# Patient Record
Sex: MALE | Race: WHITE | NOT HISPANIC OR LATINO | URBAN - METROPOLITAN AREA
[De-identification: names, ages, dates, MRNs, and addresses within clinical notes are randomized per-mention and may not be internally consistent; named-entity substitution may affect disease eponyms.]

---

## 2019-07-26 ENCOUNTER — OUTPATIENT (OUTPATIENT)
Dept: OUTPATIENT SERVICES | Facility: HOSPITAL | Age: 64
LOS: 1 days | Discharge: HOME | End: 2019-07-26

## 2019-07-26 DIAGNOSIS — D51.0 VITAMIN B12 DEFICIENCY ANEMIA DUE TO INTRINSIC FACTOR DEFICIENCY: ICD-10-CM

## 2019-07-26 DIAGNOSIS — E11.9 TYPE 2 DIABETES MELLITUS WITHOUT COMPLICATIONS: ICD-10-CM

## 2019-07-26 DIAGNOSIS — E78.5 HYPERLIPIDEMIA, UNSPECIFIED: ICD-10-CM

## 2019-07-26 DIAGNOSIS — I10 ESSENTIAL (PRIMARY) HYPERTENSION: ICD-10-CM

## 2019-07-26 DIAGNOSIS — Z00.00 ENCOUNTER FOR GENERAL ADULT MEDICAL EXAMINATION WITHOUT ABNORMAL FINDINGS: ICD-10-CM

## 2019-12-12 ENCOUNTER — OUTPATIENT (OUTPATIENT)
Dept: OUTPATIENT SERVICES | Facility: HOSPITAL | Age: 64
LOS: 1 days | Discharge: HOME | End: 2019-12-12

## 2019-12-12 DIAGNOSIS — I10 ESSENTIAL (PRIMARY) HYPERTENSION: ICD-10-CM

## 2019-12-12 DIAGNOSIS — E11.9 TYPE 2 DIABETES MELLITUS WITHOUT COMPLICATIONS: ICD-10-CM

## 2019-12-12 DIAGNOSIS — E78.00 PURE HYPERCHOLESTEROLEMIA, UNSPECIFIED: ICD-10-CM

## 2019-12-12 DIAGNOSIS — Z00.00 ENCOUNTER FOR GENERAL ADULT MEDICAL EXAMINATION WITHOUT ABNORMAL FINDINGS: ICD-10-CM

## 2022-04-21 VITALS
DIASTOLIC BLOOD PRESSURE: 70 MMHG | WEIGHT: 265 LBS | HEART RATE: 60 BPM | SYSTOLIC BLOOD PRESSURE: 120 MMHG | HEIGHT: 72 IN | BODY MASS INDEX: 35.89 KG/M2 | TEMPERATURE: 97.3 F

## 2022-04-21 VITALS
WEIGHT: 264.55 LBS | TEMPERATURE: 97.3 F | HEART RATE: 60 BPM | SYSTOLIC BLOOD PRESSURE: 120 MMHG | BODY MASS INDEX: 37.04 KG/M2 | HEIGHT: 71 IN | DIASTOLIC BLOOD PRESSURE: 70 MMHG

## 2022-08-18 ENCOUNTER — NON-APPOINTMENT (OUTPATIENT)
Age: 67
End: 2022-08-18

## 2022-08-18 DIAGNOSIS — Z86.69 PERSONAL HISTORY OF OTHER DISEASES OF THE NERVOUS SYSTEM AND SENSE ORGANS: ICD-10-CM

## 2022-08-18 DIAGNOSIS — Z86.79 PERSONAL HISTORY OF OTHER DISEASES OF THE CIRCULATORY SYSTEM: ICD-10-CM

## 2022-08-18 DIAGNOSIS — Z87.39 PERSONAL HISTORY OF OTHER DISEASES OF THE MUSCULOSKELETAL SYSTEM AND CONNECTIVE TISSUE: ICD-10-CM

## 2022-08-18 DIAGNOSIS — E88.81 METABOLIC SYNDROME: ICD-10-CM

## 2022-08-18 DIAGNOSIS — F17.200 NICOTINE DEPENDENCE, UNSPECIFIED, UNCOMPLICATED: ICD-10-CM

## 2022-08-18 RX ORDER — PANTOPRAZOLE 40 MG/1
40 TABLET, DELAYED RELEASE ORAL
Refills: 0 | Status: ACTIVE | COMMUNITY

## 2022-08-18 RX ORDER — DESLORATADINE 5 MG/1
5 TABLET ORAL
Refills: 0 | Status: ACTIVE | COMMUNITY

## 2022-09-07 ENCOUNTER — APPOINTMENT (OUTPATIENT)
Dept: CARDIOLOGY | Facility: CLINIC | Age: 67
End: 2022-09-07

## 2022-09-07 VITALS — BODY MASS INDEX: 36.54 KG/M2 | HEIGHT: 71 IN | WEIGHT: 261 LBS

## 2022-09-07 PROCEDURE — 99215 OFFICE O/P EST HI 40 MIN: CPT

## 2022-09-07 PROCEDURE — 99205 OFFICE O/P NEW HI 60 MIN: CPT

## 2022-11-21 RX ORDER — FAMOTIDINE 10 MG/1
10 TABLET, FILM COATED ORAL DAILY
Qty: 90 | Refills: 3 | Status: ACTIVE | COMMUNITY
Start: 1900-01-01 | End: 1900-01-01

## 2023-01-13 ENCOUNTER — NON-APPOINTMENT (OUTPATIENT)
Age: 68
End: 2023-01-13

## 2023-01-13 DIAGNOSIS — Z86.39 PERSONAL HISTORY OF OTHER ENDOCRINE, NUTRITIONAL AND METABOLIC DISEASE: ICD-10-CM

## 2023-01-13 DIAGNOSIS — I49.9 CARDIAC ARRHYTHMIA, UNSPECIFIED: ICD-10-CM

## 2023-01-13 DIAGNOSIS — Z86.79 PERSONAL HISTORY OF OTHER DISEASES OF THE CIRCULATORY SYSTEM: ICD-10-CM

## 2023-01-13 RX ORDER — OMEGA-3-ACID ETHYL ESTERS 1 G/1
1 CAPSULE, LIQUID FILLED ORAL 4 TIMES DAILY
Refills: 0 | Status: ACTIVE | COMMUNITY

## 2023-01-13 RX ORDER — METOPROLOL SUCCINATE 25 MG/1
25 TABLET, EXTENDED RELEASE ORAL DAILY
Refills: 0 | Status: ACTIVE | COMMUNITY

## 2023-01-30 ENCOUNTER — APPOINTMENT (OUTPATIENT)
Dept: CARDIOLOGY | Facility: CLINIC | Age: 68
End: 2023-01-30
Payer: MEDICARE

## 2023-01-30 VITALS — HEIGHT: 71 IN | BODY MASS INDEX: 35.88 KG/M2 | WEIGHT: 256.31 LBS

## 2023-01-30 VITALS — SYSTOLIC BLOOD PRESSURE: 132 MMHG | HEART RATE: 70 BPM | DIASTOLIC BLOOD PRESSURE: 80 MMHG

## 2023-01-30 PROCEDURE — 99214 OFFICE O/P EST MOD 30 MIN: CPT

## 2023-01-30 RX ORDER — FENOFIBRIC ACID 135 MG/1
135 CAPSULE, DELAYED RELEASE ORAL AT BEDTIME
Refills: 0 | Status: DISCONTINUED | COMMUNITY
End: 2023-01-30

## 2023-01-30 NOTE — HISTORY OF PRESENT ILLNESS
[FreeTextEntry1] : pt with HTN, BIGEMINY, HYPERTG, METABOLIC SYNDROME, PAF, MIKALA ON MIKALA, PT DOES NOT FEEL AFIB. \par \par Pt sister adama burr with pancreatic CA so under stress test, pb bp is controlled. pt having a lot of hip and back pain \par checked rpm and bp at 110/70 area sometimes ramesh to 47. \par pt with no cp or sob, pt not exercising much due to hurting his knee. \par LDL 46 \par HDL 35 \par  \par \par 1/30/23: TG increased to 201, A1c now 6.1 NTbnp: 415 \par h/h: 18/52.8 elevated from 16.4/49.9 at last visit \par

## 2023-01-30 NOTE — DISCUSSION/SUMMARY
[FreeTextEntry1] : pt complains muscle pain but feels due to knee not on a statin\par pt complains of spasm in his calves... pt told short achilles. \par TG at 171 good level for him \par get current meds \par pt refusing vasular w/u thinks due to muscle issues. \par \par get 2 d echo.

## 2023-03-17 ENCOUNTER — RX RENEWAL (OUTPATIENT)
Age: 68
End: 2023-03-17

## 2023-04-28 ENCOUNTER — APPOINTMENT (OUTPATIENT)
Dept: CARDIOLOGY | Facility: CLINIC | Age: 68
End: 2023-04-28
Payer: MEDICARE

## 2023-04-28 PROCEDURE — 93306 TTE W/DOPPLER COMPLETE: CPT

## 2023-06-14 NOTE — HISTORY OF PRESENT ILLNESS
[FreeTextEntry1] : pt with HTN, BIGEMINY, HYPERTG, METABOLIC SYNDROME, PAF, MIKALA ON MIKAAL, PT DOES NOT FEEL AFIB. \par \par Pt sister adama burr with pancreatic CA so under stress test, pb bp is controlled. pt having a lot of hip and back pain \par checked rpm and bp at 110/70 area sometimes ramesh to 47. \par pt with no cp or sob, pt not exercising much due to hurting his knee. \par LDL 46 \par HDL 35 \par  \par \par 23: TG increased to 201, A1c now 6.1 NTbnp: 415 \par h/h: 18/52.8 elevated from 16.4/49.9 at last visit \par pt does not feel bigeminy at this time, stressed from his sister with cancer, pt brother , pt TG increased but eating poorly and A1c: 6.1, pt stopped jardiance for a few days. \par pt still elevated TG, pt no bleeding problems. \par pt had spasms in back and not exercising the last 6 months. \par \par 23:\par 23: EF: 61%, ? AFIB HR , TR ARMANDO: 2.4 m/s, CO: 4.4 l/min, AV armando: 1.74, aorta: 3.9cm, mild AR, mild LVH

## 2023-06-14 NOTE — CARDIOLOGY SUMMARY
[de-identified] : 4/28/23: EF: 61%, ? AFIB HR , TR ES: 2.4 m/s, CO: 4.4 l/min, AV es: 1.74, aorta: 3.9cm, mild AR, mild LVH

## 2023-06-14 NOTE — HISTORY OF PRESENT ILLNESS
[FreeTextEntry1] : pt with HTN, BIGEMINY, HYPERTG, METABOLIC SYNDROME, PAF, MIKALA ON MIKALA, PT DOES NOT FEEL AFIB. \par \par Pt sister adama burr with pancreatic CA so under stress test, pb bp is controlled. pt having a lot of hip and back pain \par checked rpm and bp at 110/70 area sometimes ramesh to 47. \par pt with no cp or sob, pt not exercising much due to hurting his knee. \par LDL 46 \par HDL 35 \par  \par \par 23: TG increased to 201, A1c now 6.1 NTbnp: 415 \par h/h: 18/52.8 elevated from 16.4/49.9 at last visit \par pt does not feel bigeminy at this time, stressed from his sister with cancer, pt brother , pt TG increased but eating poorly and A1c: 6.1, pt stopped jardiance for a few days. \par pt still elevated TG, pt no bleeding problems. \par pt had spasms in back and not exercising the last 6 months. \par \par 23:\par 23: EF: 61%, ? AFIB HR , TR ARMANDO: 2.4 m/s, CO: 4.4 l/min, AV armando: 1.74, aorta: 3.9cm, mild AR, mild LVH

## 2023-06-14 NOTE — DISCUSSION/SUMMARY
[FreeTextEntry1] : pt complains muscle pain but feels due to knee not on a statin\par pt complains of spasm in his calves... pt told short achilles. \par TG at 171 good level for him \par get current meds \par pt refusing vasular w/u thinks due to muscle issues. \par \par 1/30/23: \par pt was taking care of sister, pt to improve diet \par pt does not feel afib and ok \par will get 2 d echo\par continue xarelto \par pt was cv in past can get monitor in future \par h/h high check next visit

## 2023-06-14 NOTE — CARDIOLOGY SUMMARY
[de-identified] : 4/28/23: EF: 61%, ? AFIB HR , TR ES: 2.4 m/s, CO: 4.4 l/min, AV es: 1.74, aorta: 3.9cm, mild AR, mild LVH

## 2023-06-14 NOTE — CARDIOLOGY SUMMARY
[de-identified] : 4/28/23: EF: 61%, ? AFIB HR , TR ES: 2.4 m/s, CO: 4.4 l/min, AV es: 1.74, aorta: 3.9cm, mild AR, mild LVH

## 2023-06-19 ENCOUNTER — APPOINTMENT (OUTPATIENT)
Dept: CARDIOLOGY | Facility: CLINIC | Age: 68
End: 2023-06-19
Payer: MEDICARE

## 2023-06-19 VITALS — HEART RATE: 98 BPM | OXYGEN SATURATION: 65 % | WEIGHT: 262 LBS | BODY MASS INDEX: 36.68 KG/M2 | HEIGHT: 71 IN

## 2023-06-19 VITALS — SYSTOLIC BLOOD PRESSURE: 118 MMHG | HEART RATE: 60 BPM | DIASTOLIC BLOOD PRESSURE: 70 MMHG

## 2023-06-19 DIAGNOSIS — I25.89 OTHER FORMS OF CHRONIC ISCHEMIC HEART DISEASE: ICD-10-CM

## 2023-06-19 PROCEDURE — 99214 OFFICE O/P EST MOD 30 MIN: CPT

## 2023-07-07 RX ORDER — ATORVASTATIN CALCIUM 20 MG/1
20 TABLET, FILM COATED ORAL
Qty: 90 | Refills: 3 | Status: ACTIVE | COMMUNITY
Start: 1900-01-01 | End: 1900-01-01

## 2023-07-18 RX ORDER — RIVAROXABAN 20 MG/1
20 TABLET, FILM COATED ORAL
Qty: 90 | Refills: 3 | Status: ACTIVE | COMMUNITY
Start: 1900-01-01 | End: 1900-01-01

## 2023-07-18 RX ORDER — EZETIMIBE 10 MG/1
10 TABLET ORAL DAILY
Qty: 90 | Refills: 3 | Status: ACTIVE | COMMUNITY
Start: 1900-01-01 | End: 1900-01-01

## 2023-08-14 ENCOUNTER — RX CHANGE (OUTPATIENT)
Age: 68
End: 2023-08-14

## 2023-08-14 RX ORDER — VALSARTAN 160 MG/1
160 TABLET, COATED ORAL DAILY
Qty: 90 | Refills: 3 | Status: DISCONTINUED | COMMUNITY
Start: 2023-03-17 | End: 2023-08-14

## 2023-08-14 RX ORDER — VALSARTAN 160 MG/1
160 TABLET, COATED ORAL
Qty: 90 | Refills: 3 | Status: ACTIVE | COMMUNITY
Start: 1900-01-01 | End: 1900-01-01

## 2023-10-16 RX ORDER — METOPROLOL SUCCINATE 25 MG/1
25 TABLET, EXTENDED RELEASE ORAL DAILY
Qty: 90 | Refills: 3 | Status: ACTIVE | COMMUNITY
Start: 1900-01-01 | End: 1900-01-01

## 2023-10-21 ENCOUNTER — NON-APPOINTMENT (OUTPATIENT)
Age: 68
End: 2023-10-21

## 2023-12-04 ENCOUNTER — RX RENEWAL (OUTPATIENT)
Age: 68
End: 2023-12-04

## 2024-01-09 ENCOUNTER — RX RENEWAL (OUTPATIENT)
Age: 69
End: 2024-01-09

## 2024-01-09 RX ORDER — EMPAGLIFLOZIN 10 MG/1
10 TABLET, FILM COATED ORAL DAILY
Qty: 30 | Refills: 11 | Status: ACTIVE | COMMUNITY
Start: 1900-01-01 | End: 1900-01-01

## 2024-01-11 ENCOUNTER — OUTPATIENT (OUTPATIENT)
Dept: OUTPATIENT SERVICES | Facility: HOSPITAL | Age: 69
LOS: 1 days | End: 2024-01-11
Payer: MEDICARE

## 2024-01-11 ENCOUNTER — RESULT REVIEW (OUTPATIENT)
Age: 69
End: 2024-01-11

## 2024-01-11 DIAGNOSIS — I65.23 OCCLUSION AND STENOSIS OF BILATERAL CAROTID ARTERIES: ICD-10-CM

## 2024-01-11 PROCEDURE — 75574 CT ANGIO HRT W/3D IMAGE: CPT

## 2024-01-11 PROCEDURE — 75574 CT ANGIO HRT W/3D IMAGE: CPT | Mod: 26,MH

## 2024-01-12 DIAGNOSIS — I65.23 OCCLUSION AND STENOSIS OF BILATERAL CAROTID ARTERIES: ICD-10-CM

## 2024-01-12 NOTE — DISCUSSION/SUMMARY
[FreeTextEntry1] : 1/30/23: \par pt was taking care of sister, pt to improve diet \par pt does not feel afib and ok \par continue xarelto \par pt was cv in past can get monitor in future \par h/h high check next visit \par \par 6/19/23: Pt on statin/zetia/fibrate/omega 3 for lipid profile \par continue Jardiance \par no bleeding on Xarelto and does not feel afib. \par pt not very active - pt had injections, \par get CTA on patient as chronically elevated lipids and not very active. \par blood work \par f/u after cta \par

## 2024-01-12 NOTE — HISTORY OF PRESENT ILLNESS
[FreeTextEntry1] : pt with HTN, BIGEMINY, HYPERTG, METABOLIC SYNDROME, PAF, MIKALA ON MIKALA, PT DOES NOT FEEL AFIB. dilate ascending aorta, LVH   Pt sister adama burr with pancreatic CA so under stress test, pb bp is controlled. pt having a lot of hip and back pain  checked rpm and bp at 110/70 area sometimes ramesh to 47.  pt with no cp or sob, pt not exercising much due to hurting his knee. LDL 46 HDL 35    23: TG increased to 201, A1c now 6.1 NTbnp: 415  h/h: 18/52.8 elevated from 16.4/49.9 at last visit  pt does not feel bigeminy at this time, stressed from his sister with cancer, pt brother , pt TG increased but eating poorly and A1c: 6.1, pt stopped jardiance for a few days.  pt still elevated TG, pt no bleeding problems.  pt had spasms in back and not exercising the last 6 months.   23: 23: EF: 61%,  AFIB HR , TR ARMANDO: 2.4 m/s, CO: 4.4 l/min, AV armando: 1.74, aorta: 3.9cm, mild AR, mild LVH : T, A1c: 6 ntbnp: 272 improved HDL 35  Patient states does not exercise that much due to bad back, shoulder, hip, and knee and not very active.  pt still with LVH. bp controlled.  23: CAC: 161  LAD 50%

## 2024-01-12 NOTE — CARDIOLOGY SUMMARY
[de-identified] : 4/28/23: EF: 61%, ? AFIB HR , TR ES: 2.4 m/s, CO: 4.4 l/min, AV es: 1.74, aorta: 3.9cm, mild AR, mild LVH

## 2024-01-23 ENCOUNTER — APPOINTMENT (OUTPATIENT)
Dept: CARDIOLOGY | Facility: CLINIC | Age: 69
End: 2024-01-23
Payer: MEDICARE

## 2024-01-23 VITALS — WEIGHT: 267 LBS | BODY MASS INDEX: 37.38 KG/M2 | OXYGEN SATURATION: 81 % | HEIGHT: 71 IN | HEART RATE: 81 BPM

## 2024-01-23 DIAGNOSIS — I11.9 HYPERTENSIVE HEART DISEASE W/OUT HEART FAILURE: ICD-10-CM

## 2024-01-23 DIAGNOSIS — Z00.00 ENCOUNTER FOR GENERAL ADULT MEDICAL EXAMINATION W/OUT ABNORMAL FINDINGS: ICD-10-CM

## 2024-01-23 PROCEDURE — 99214 OFFICE O/P EST MOD 30 MIN: CPT

## 2024-01-23 NOTE — DISCUSSION/SUMMARY
[FreeTextEntry1] : 6/19/23: Pt on statin/zetia/fibrate/omega 3 for lipid profile  needs to improve diet  TSH. elevated f/u pmd  consider swimming or bicycling.  continue Jardiance  no bleeding on Xarelto and does not feel afib.  pt not very active - pt had injections,  get bloodwork f/u in 4 months

## 2024-01-23 NOTE — CARDIOLOGY SUMMARY
[de-identified] : 4/28/23: EF: 61%, ? AFIB HR , TR ES: 2.4 m/s, CO: 4.4 l/min, AV es: 1.74, aorta: 3.9cm, mild AR, mild LVH

## 2024-01-23 NOTE — HISTORY OF PRESENT ILLNESS
[FreeTextEntry1] : pt with CTA : CAC: 161, LAD 50% (motion artifact), dilated aorta 4 cm. HTN, BIGEMINY, HYPERTG, METABOLIC SYNDROME, PAF, MIKALA ON MIKALA, PT DOES NOT FEEL AFIB. dilated ascending aorta, LVH   Pt sister adama burr with pancreatic CA so under stress test, pb bp is controlled. pt having a lot of hip and back pain  checked rpm and bp at 110/70 area sometimes ramesh to 47.  pt with no cp or sob, pt not exercising much due to hurting his knee. LDL 46 HDL 35    23: TG increased to 201, A1c now 6.1 NTbnp: 415  h/h: 18/52.8 elevated from 16.4/49.9 at last visit  pt does not feel bigeminy at this time, stressed from his sister with cancer, pt brother , pt TG increased but eating poorly and A1c: 6.1, pt stopped jardiance for a few days.  pt still elevated TG, pt no bleeding problems.  pt had spasms in back and not exercising the last 6 months.   23: 23: EF: 61%, AFIB HR , TR ARMANDO: 2.4 m/s, CO: 4.4 l/min, AV armando: 1.74, aorta: 3.9cm, mild AR, mild LVH : T, A1c: 6 ntbnp: 272 improved HDL 35  Patient states does not exercise that much due to bad back, shoulder, hip, and knee and not very active.  pt still with LVH. bp controlled.  24: 23: CTA: CAC: 161 LAD 50% 24: a1c: 6.2 increasing, TSH: elevated at 6.928, TG increased to 257 HDL 31  bnp: increased to 446  LDL 44 pt ate poorly during the holidays. pt not exercising due to back and hip. pt has knee issues. pt tore rotator cuff and not working and advised. pt not exercising. pt TSH never an issue, pt denies tired and not sleeping much. pt is NSR currently.

## 2024-02-15 ENCOUNTER — NON-APPOINTMENT (OUTPATIENT)
Age: 69
End: 2024-02-15

## 2024-02-20 ENCOUNTER — NON-APPOINTMENT (OUTPATIENT)
Age: 69
End: 2024-02-20

## 2024-02-26 RX ORDER — OMEGA-3-ACID ETHYL ESTERS CAPSULES 1 G/1
1 CAPSULE, LIQUID FILLED ORAL DAILY
Qty: 360 | Refills: 3 | Status: ACTIVE | COMMUNITY
Start: 1900-01-01 | End: 1900-01-01

## 2024-03-29 ENCOUNTER — NON-APPOINTMENT (OUTPATIENT)
Age: 69
End: 2024-03-29

## 2024-04-18 RX ORDER — FENOFIBRATE 160 MG/1
160 TABLET ORAL DAILY
Qty: 90 | Refills: 3 | Status: ACTIVE | COMMUNITY
Start: 1900-01-01 | End: 1900-01-01

## 2024-05-23 ENCOUNTER — TRANSCRIPTION ENCOUNTER (OUTPATIENT)
Age: 69
End: 2024-05-23

## 2024-05-23 ENCOUNTER — APPOINTMENT (OUTPATIENT)
Dept: CARDIOLOGY | Facility: CLINIC | Age: 69
End: 2024-05-23
Payer: MEDICARE

## 2024-05-23 VITALS — WEIGHT: 265 LBS | BODY MASS INDEX: 37.1 KG/M2 | HEIGHT: 71 IN

## 2024-05-23 DIAGNOSIS — R73.03 PREDIABETES.: ICD-10-CM

## 2024-05-23 DIAGNOSIS — I65.23 OCCLUSION AND STENOSIS OF BILATERAL CAROTID ARTERIES: ICD-10-CM

## 2024-05-23 DIAGNOSIS — E78.2 MIXED HYPERLIPIDEMIA: ICD-10-CM

## 2024-05-23 DIAGNOSIS — I48.0 PAROXYSMAL ATRIAL FIBRILLATION: ICD-10-CM

## 2024-05-23 DIAGNOSIS — I10 ESSENTIAL (PRIMARY) HYPERTENSION: ICD-10-CM

## 2024-05-23 DIAGNOSIS — I77.810 THORACIC AORTIC ECTASIA: ICD-10-CM

## 2024-05-23 DIAGNOSIS — I49.3 VENTRICULAR PREMATURE DEPOLARIZATION: ICD-10-CM

## 2024-05-23 PROCEDURE — 99214 OFFICE O/P EST MOD 30 MIN: CPT

## 2024-05-23 PROCEDURE — G2211 COMPLEX E/M VISIT ADD ON: CPT

## 2024-05-23 NOTE — HISTORY OF PRESENT ILLNESS
[FreeTextEntry1] : pt with CTA : CAC: 161, LAD 50% (motion artifact), dilated aorta 4 cm. HTN, BIGEMINY, PAF, HYPERTG, METABOLIC SYNDROME, PREDM, MIKALA ON MIKALA, PT DOES NOT FEEL AFIB, LVH   Pt sister adama burr with pancreatic CA so under stress test, pb bp is controlled. pt having a lot of hip and back pain  checked rpm and bp at 110/70 area sometimes ramesh to 47.  pt with no cp or sob, pt not exercising much due to hurting his knee. LDL 46 HDL 35    23: TG increased to 201, A1c now 6.1 NTbnp: 415  h/h: 18/52.8 elevated from 16.4/49.9 at last visit  pt does not feel bigeminy at this time, stressed from his sister with cancer, pt brother , pt TG increased but eating poorly and A1c: 6.1, pt stopped jardiance for a few days.  pt still elevated TG, pt no bleeding problems.  pt had spasms in back and not exercising the last 6 months.   23: 23: EF: 61%, AFIB HR , TR ARMANDO: 2.4 m/s, CO: 4.4 l/min, AV armando: 1.74, aorta: 3.9cm, mild AR, mild LVH : T, A1c: 6 ntbnp: 272 improved HDL 35  Patient states does not exercise that much due to bad back, shoulder, hip, and knee and not very active.  pt still with LVH. bp controlled.  24: 23: CTA: CAC: 161 LAD 50% 24: a1c: 6.2 increasing, TSH: elevated at 6.928, TG increased to 257 HDL 31  bnp: increased to 446  LDL 44 pt ate poorly during the holidays. pt not exercising due to back and hip. pt has knee issues. pt tore rotator cuff and not working and advised. pt not exercising. pt TSH never an issue, pt denies tired and not sleeping much. pt is NSR currently.   24: 24: 17.2/49.3 T, HDL: 33, LDL: 50, A1C: 6.1, BNP:  PEND. Bw done today. elevated in the past. pt has had labile bp at home. pt sister with dementia. pt not exercising, pt had cortisone in ankle and knee replacement. no cp or sob. TG increased further and was worse.  TSH: 2.970  pt eating a lot of torrones.

## 2024-05-23 NOTE — DISCUSSION/SUMMARY
[FreeTextEntry1] : cont fenofibrate 160  cont atorvastatin 20 mg  cont ezetimibe 10 mg po qhs  cont omega 3  prediabetes needs to improve diet  consider swimming or bicycling.  continue Jardiance 10 mg daily  cont sotalol 40 q12  cont metoprolol er 25  cont valsartan 150 daily  no bleeding on Xarelto 20 and does not feel afib.  pt not very active - pt had injections for back  get bloodwork f/u in 4 months 2 d echo

## 2024-05-23 NOTE — CARDIOLOGY SUMMARY
[de-identified] : 4/28/23: EF: 61%, ? AFIB HR , TR ES: 2.4 m/s, CO: 4.4 l/min, AV es: 1.74, aorta: 3.9cm, mild AR, mild LVH

## 2024-06-25 RX ORDER — SOTALOL HYDROCHLORIDE 80 MG/1
80 TABLET ORAL
Qty: 90 | Refills: 3 | Status: ACTIVE | COMMUNITY
Start: 2023-12-04 | End: 1900-01-01

## 2024-08-29 ENCOUNTER — RX RENEWAL (OUTPATIENT)
Age: 69
End: 2024-08-29

## 2024-09-09 ENCOUNTER — RX RENEWAL (OUTPATIENT)
Age: 69
End: 2024-09-09

## 2024-10-03 ENCOUNTER — APPOINTMENT (OUTPATIENT)
Dept: CARDIOLOGY | Facility: CLINIC | Age: 69
End: 2024-10-03
Payer: MEDICARE

## 2024-10-03 PROCEDURE — 93306 TTE W/DOPPLER COMPLETE: CPT

## 2024-10-10 ENCOUNTER — APPOINTMENT (OUTPATIENT)
Dept: CARDIOLOGY | Facility: CLINIC | Age: 69
End: 2024-10-10
Payer: MEDICARE

## 2024-10-10 VITALS — WEIGHT: 266 LBS | BODY MASS INDEX: 37.24 KG/M2 | HEIGHT: 71 IN

## 2024-10-10 VITALS — DIASTOLIC BLOOD PRESSURE: 60 MMHG | SYSTOLIC BLOOD PRESSURE: 98 MMHG | HEART RATE: 80 BPM

## 2024-10-10 VITALS — DIASTOLIC BLOOD PRESSURE: 60 MMHG | HEART RATE: 80 BPM | SYSTOLIC BLOOD PRESSURE: 87 MMHG

## 2024-10-10 DIAGNOSIS — I77.810 THORACIC AORTIC ECTASIA: ICD-10-CM

## 2024-10-10 DIAGNOSIS — I48.0 PAROXYSMAL ATRIAL FIBRILLATION: ICD-10-CM

## 2024-10-10 DIAGNOSIS — E78.2 MIXED HYPERLIPIDEMIA: ICD-10-CM

## 2024-10-10 DIAGNOSIS — I11.9 HYPERTENSIVE HEART DISEASE W/OUT HEART FAILURE: ICD-10-CM

## 2024-10-10 DIAGNOSIS — R73.03 PREDIABETES.: ICD-10-CM

## 2024-10-10 DIAGNOSIS — I49.3 VENTRICULAR PREMATURE DEPOLARIZATION: ICD-10-CM

## 2024-10-10 DIAGNOSIS — I10 ESSENTIAL (PRIMARY) HYPERTENSION: ICD-10-CM

## 2024-10-10 DIAGNOSIS — I65.23 OCCLUSION AND STENOSIS OF BILATERAL CAROTID ARTERIES: ICD-10-CM

## 2024-10-10 PROCEDURE — 99214 OFFICE O/P EST MOD 30 MIN: CPT

## 2024-10-10 PROCEDURE — 93000 ELECTROCARDIOGRAM COMPLETE: CPT

## 2024-10-10 PROCEDURE — G2211 COMPLEX E/M VISIT ADD ON: CPT

## 2024-10-18 ENCOUNTER — RX RENEWAL (OUTPATIENT)
Age: 69
End: 2024-10-18

## 2025-02-07 ENCOUNTER — APPOINTMENT (OUTPATIENT)
Dept: CARDIOLOGY | Facility: CLINIC | Age: 70
End: 2025-02-07
Payer: MEDICARE

## 2025-02-07 PROCEDURE — 93880 EXTRACRANIAL BILAT STUDY: CPT

## 2025-02-28 ENCOUNTER — APPOINTMENT (OUTPATIENT)
Dept: CARDIOLOGY | Facility: CLINIC | Age: 70
End: 2025-02-28
Payer: MEDICARE

## 2025-02-28 ENCOUNTER — NON-APPOINTMENT (OUTPATIENT)
Age: 70
End: 2025-02-28

## 2025-02-28 VITALS
SYSTOLIC BLOOD PRESSURE: 110 MMHG | OXYGEN SATURATION: 93 % | RESPIRATION RATE: 14 BRPM | HEART RATE: 102 BPM | HEIGHT: 71 IN | DIASTOLIC BLOOD PRESSURE: 60 MMHG | WEIGHT: 265 LBS | BODY MASS INDEX: 37.1 KG/M2

## 2025-02-28 DIAGNOSIS — R73.03 PREDIABETES.: ICD-10-CM

## 2025-02-28 DIAGNOSIS — I48.0 PAROXYSMAL ATRIAL FIBRILLATION: ICD-10-CM

## 2025-02-28 DIAGNOSIS — I10 ESSENTIAL (PRIMARY) HYPERTENSION: ICD-10-CM

## 2025-02-28 DIAGNOSIS — I65.23 OCCLUSION AND STENOSIS OF BILATERAL CAROTID ARTERIES: ICD-10-CM

## 2025-02-28 DIAGNOSIS — I11.9 HYPERTENSIVE HEART DISEASE W/OUT HEART FAILURE: ICD-10-CM

## 2025-02-28 DIAGNOSIS — I77.810 THORACIC AORTIC ECTASIA: ICD-10-CM

## 2025-02-28 DIAGNOSIS — E78.2 MIXED HYPERLIPIDEMIA: ICD-10-CM

## 2025-02-28 PROCEDURE — 93000 ELECTROCARDIOGRAM COMPLETE: CPT

## 2025-02-28 PROCEDURE — G2211 COMPLEX E/M VISIT ADD ON: CPT

## 2025-02-28 PROCEDURE — 99214 OFFICE O/P EST MOD 30 MIN: CPT

## 2025-06-27 ENCOUNTER — APPOINTMENT (OUTPATIENT)
Dept: CARDIOLOGY | Facility: CLINIC | Age: 70
End: 2025-06-27
Payer: MEDICARE

## 2025-06-27 VITALS
WEIGHT: 254 LBS | DIASTOLIC BLOOD PRESSURE: 92 MMHG | SYSTOLIC BLOOD PRESSURE: 139 MMHG | BODY MASS INDEX: 35.56 KG/M2 | HEIGHT: 71 IN | HEART RATE: 91 BPM

## 2025-06-27 VITALS — SYSTOLIC BLOOD PRESSURE: 110 MMHG | DIASTOLIC BLOOD PRESSURE: 70 MMHG

## 2025-06-27 PROCEDURE — G2211 COMPLEX E/M VISIT ADD ON: CPT

## 2025-06-27 PROCEDURE — 99214 OFFICE O/P EST MOD 30 MIN: CPT

## 2025-06-27 RX ORDER — FENOFIBRATE 200 MG/1
200 CAPSULE ORAL
Qty: 90 | Refills: 3 | Status: ACTIVE | COMMUNITY
Start: 2025-06-27 | End: 1900-01-01

## 2025-08-11 ENCOUNTER — RX RENEWAL (OUTPATIENT)
Age: 70
End: 2025-08-11